# Patient Record
Sex: FEMALE | Race: AMERICAN INDIAN OR ALASKA NATIVE
[De-identification: names, ages, dates, MRNs, and addresses within clinical notes are randomized per-mention and may not be internally consistent; named-entity substitution may affect disease eponyms.]

---

## 2019-06-10 ENCOUNTER — HOSPITAL ENCOUNTER (OUTPATIENT)
Dept: HOSPITAL 5 - SPVWC | Age: 59
Discharge: HOME | End: 2019-06-10
Attending: FAMILY MEDICINE
Payer: OTHER GOVERNMENT

## 2019-06-10 DIAGNOSIS — Z12.31: Primary | ICD-10-CM

## 2019-06-10 PROCEDURE — 77067 SCR MAMMO BI INCL CAD: CPT

## 2019-06-10 NOTE — MAMMOGRAPHY REPORT
BILATERAL DIGITAL SCREENING MAMMOGRAM with CAD: 06/10/19 15:14:00



CLINICAL: Routine screening.



COMPARISON:11/28/17 and mammograms going back to 07/15/10



FINDINGS: The breasts are heterogeneously dense, which may obscure 

small masses.  A left asymmetry on the MLO view requires additional 

imaging.No architectural distortion or suspicious calcifications.The 

right breast is negative.



IMPRESSION: Left asymmetry requiring further workup.



BI-RADS CATEGORY:  0 -- Additional Imaging Evaluation Required



RECOMMENDATION: Recall for left mediolateral and spot compression MLO 

views and left breast ultrasound if needed.





COMMENT:

      1.   Dense breast tissue, i.e., adenosis, fibrocystic 

            changes, etc., may obscure an underlying neoplasm.

      2.   Approximately 10% of cancers are not detected with

            mammography.

      3.   A negative mammography report should not delay biopsy 

            if a clinically suspicious mass is present.



COMMENT:

Patient follow-up letters are generated via our [employer] application.

## 2019-07-17 ENCOUNTER — HOSPITAL ENCOUNTER (OUTPATIENT)
Dept: HOSPITAL 5 - SPVWC | Age: 59
Discharge: HOME | End: 2019-07-17
Attending: FAMILY MEDICINE
Payer: OTHER GOVERNMENT

## 2019-07-17 DIAGNOSIS — R92.8: Primary | ICD-10-CM

## 2019-07-17 NOTE — MAMMOGRAPHY REPORT
LEFT DIGITAL DIAGNOSTIC MAMMOGRAM



INDICATION: Recalled for asymmetry. 



TECHNIQUE:  Digital left mammographic imaging was performed.





COMPARISON: 6/10/2019



FINDINGS: 



Breast Density: The breasts are heterogeneously dense, which may obscure small masses. Additional lef
t mammographic views were performed and are negative. Satisfactory effacement of asymmetry. 



IMPRESSION: No mammographic evidence of malignancy.



BI-RADS Category 1:  Negative. Recommend routine screening mammography in one year.



A "normal" or negative report should not discourage follow up or biopsy of a clinically significant f
inding.



A written summary of these findings will be mailed to the patient. The patient will be entered into a
 mammography reporting system which will generate a reminder letter for the patient's next appointmen
t at the appropriate interval.



FURTHER INFORMATION:  According to the [employer], yearly mammograms are recommend
ed starting at age 40 and continuing as long as a woman is in good health.  Breast MRI is recommended
 for women with an approximately 20-25% or greater lifetime risk of breast cancer, including women wi
th a strong family history of breast or ovarian cancer and women who have been treated for Hodgkin's 
disease.



Signer Name: Cristobal Sandra MD 

Signed: 7/17/2019 11:55 AM

 Workstation Name: [id number]